# Patient Record
(demographics unavailable — no encounter records)

---

## 2025-04-15 NOTE — PHYSICAL EXAM
[Person] : oriented to person [Place] : oriented to place [Time] : oriented to time [Concentration Intact] : normal concentrating ability [Visual Intact] : visual attention was ~T not ~L decreased [Naming Objects] : no difficulty naming common objects [Repeating Phrases] : no difficulty repeating a phrase [Writing A Sentence] : no difficulty writing a sentence [Fluency] : fluency intact [Comprehension] : comprehension intact [Reading] : reading intact [Past History] : adequate knowledge of personal past history [Cranial Nerves Optic (II)] : visual acuity intact bilaterally,  visual fields full to confrontation, pupils equal round and reactive to light [Cranial Nerves Oculomotor (III)] : extraocular motion intact [Cranial Nerves Trigeminal (V)] : facial sensation intact symmetrically [Cranial Nerves Facial (VII)] : face symmetrical [Cranial Nerves Glossopharyngeal (IX)] : tongue and palate midline [Cranial Nerves Vestibulocochlear (VIII)] : hearing was intact bilaterally [Cranial Nerves Accessory (XI - Cranial And Spinal)] : head turning and shoulder shrug symmetric [Cranial Nerves Hypoglossal (XII)] : there was no tongue deviation with protrusion [Motor Tone] : muscle tone was normal in all four extremities [Motor Strength] : muscle strength was normal in all four extremities [No Muscle Atrophy] : normal bulk in all four extremities [Sensation Tactile Decrease] : light touch was intact [Abnormal Walk] : normal gait [Balance] : balance was intact [Past-pointing] : there was no past-pointing [Tremor] : no tremor present [2+] : Ankle jerk left 2+ [Plantar Reflex Right Only] : normal on the right [Plantar Reflex Left Only] : normal on the left [FreeTextEntry6] : Mild bilateral action/postural tremor

## 2025-04-15 NOTE — HISTORY OF PRESENT ILLNESS
[FreeTextEntry1] : Patient presents with chief complaint of bilateral hand tremors that have been present for several years, first noticed by his mother when he was 13 years old. The tremor is predominantly postural in nature, affecting both hands with left hand being more pronounced. Patient reports that the tremor worsens with caffeine intake, anxiety, and lack of sleep. Previously consumed alcohol but has discontinued; did not notice significant improvement in tremors with alcohol use. The condition has not significantly impacted his daily activities, and patient states he sought medical attention primarily out of curiosity. No family history of tremors reported. 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, aspiring to become a therapist or .

## 2025-04-15 NOTE — ASSESSMENT
[FreeTextEntry1] : 1. Essential Tremor - Clinical presentation consistent with essential tremor - Symptoms exacerbated by typical triggers (caffeine, anxiety, sleep deprivation) - Plan: * MRI brain ordered to rule out other neurological conditions * No medication indicated at this time as symptoms are not interfering with daily activities * Patient to follow up via phone in 2-3 days after MRI completion for results * Patient educated about benign nature of condition and typical physiologic tremor * No further follow-up needed if MRI is normal and symptoms remain stable